# Patient Record
Sex: MALE | Race: BLACK OR AFRICAN AMERICAN | NOT HISPANIC OR LATINO | Employment: FULL TIME | URBAN - METROPOLITAN AREA
[De-identification: names, ages, dates, MRNs, and addresses within clinical notes are randomized per-mention and may not be internally consistent; named-entity substitution may affect disease eponyms.]

---

## 2024-07-23 ENCOUNTER — APPOINTMENT (OUTPATIENT)
Dept: CT IMAGING | Facility: CLINIC | Age: 24
End: 2024-07-23
Attending: EMERGENCY MEDICINE
Payer: COMMERCIAL

## 2024-07-23 ENCOUNTER — HOSPITAL ENCOUNTER (EMERGENCY)
Facility: CLINIC | Age: 24
Discharge: HOME | End: 2024-07-23
Attending: EMERGENCY MEDICINE | Admitting: EMERGENCY MEDICINE
Payer: COMMERCIAL

## 2024-07-23 VITALS
BODY MASS INDEX: 25.28 KG/M2 | HEIGHT: 74 IN | HEART RATE: 85 BPM | RESPIRATION RATE: 20 BRPM | TEMPERATURE: 97.6 F | OXYGEN SATURATION: 100 % | SYSTOLIC BLOOD PRESSURE: 105 MMHG | DIASTOLIC BLOOD PRESSURE: 42 MMHG | WEIGHT: 197 LBS

## 2024-07-23 DIAGNOSIS — N20.0 KIDNEY STONE: ICD-10-CM

## 2024-07-23 LAB
ALBUMIN SERPL BCG-MCNC: 4.7 G/DL (ref 3.5–5.2)
ALBUMIN UR-MCNC: NEGATIVE MG/DL
ALP SERPL-CCNC: 78 U/L (ref 40–150)
ALT SERPL W P-5'-P-CCNC: 23 U/L (ref 0–70)
ANION GAP SERPL CALCULATED.3IONS-SCNC: 12 MMOL/L (ref 7–15)
APPEARANCE UR: CLEAR
AST SERPL W P-5'-P-CCNC: 21 U/L (ref 0–45)
BASOPHILS # BLD AUTO: 0 10E3/UL (ref 0–0.2)
BASOPHILS NFR BLD AUTO: 0 %
BILIRUB SERPL-MCNC: 0.3 MG/DL
BILIRUB UR QL STRIP: NEGATIVE
BUN SERPL-MCNC: 12.8 MG/DL (ref 6–20)
CALCIUM SERPL-MCNC: 9.5 MG/DL (ref 8.8–10.4)
CHLORIDE SERPL-SCNC: 104 MMOL/L (ref 98–107)
COLOR UR AUTO: ABNORMAL
CREAT SERPL-MCNC: 0.9 MG/DL (ref 0.67–1.17)
EGFRCR SERPLBLD CKD-EPI 2021: >90 ML/MIN/1.73M2
EOSINOPHIL # BLD AUTO: 0.1 10E3/UL (ref 0–0.7)
EOSINOPHIL NFR BLD AUTO: 1 %
ERYTHROCYTE [DISTWIDTH] IN BLOOD BY AUTOMATED COUNT: 11.7 % (ref 10–15)
GLUCOSE SERPL-MCNC: 105 MG/DL (ref 70–99)
GLUCOSE UR STRIP-MCNC: NEGATIVE MG/DL
HCO3 SERPL-SCNC: 24 MMOL/L (ref 22–29)
HCT VFR BLD AUTO: 42.2 % (ref 40–53)
HGB BLD-MCNC: 13.9 G/DL (ref 13.3–17.7)
HGB UR QL STRIP: ABNORMAL
HOLD SPECIMEN: NORMAL
IMM GRANULOCYTES # BLD: 0 10E3/UL
IMM GRANULOCYTES NFR BLD: 0 %
KETONES UR STRIP-MCNC: NEGATIVE MG/DL
LEUKOCYTE ESTERASE UR QL STRIP: NEGATIVE
LYMPHOCYTES # BLD AUTO: 1.7 10E3/UL (ref 0.8–5.3)
LYMPHOCYTES NFR BLD AUTO: 18 %
MCH RBC QN AUTO: 29.5 PG (ref 26.5–33)
MCHC RBC AUTO-ENTMCNC: 32.9 G/DL (ref 31.5–36.5)
MCV RBC AUTO: 90 FL (ref 78–100)
MONOCYTES # BLD AUTO: 0.5 10E3/UL (ref 0–1.3)
MONOCYTES NFR BLD AUTO: 5 %
MUCOUS THREADS #/AREA URNS LPF: PRESENT /LPF
NEUTROPHILS # BLD AUTO: 7.3 10E3/UL (ref 1.6–8.3)
NEUTROPHILS NFR BLD AUTO: 76 %
NITRATE UR QL: NEGATIVE
NRBC # BLD AUTO: 0 10E3/UL
NRBC BLD AUTO-RTO: 0 /100
PH UR STRIP: 7 [PH] (ref 5–7)
PLATELET # BLD AUTO: 308 10E3/UL (ref 150–450)
POTASSIUM SERPL-SCNC: 4.1 MMOL/L (ref 3.4–5.3)
PROT SERPL-MCNC: 7.5 G/DL (ref 6.4–8.3)
RBC # BLD AUTO: 4.71 10E6/UL (ref 4.4–5.9)
RBC URINE: 79 /HPF
SODIUM SERPL-SCNC: 140 MMOL/L (ref 135–145)
SP GR UR STRIP: 1.01 (ref 1–1.03)
UROBILINOGEN UR STRIP-MCNC: NORMAL MG/DL
WBC # BLD AUTO: 9.5 10E3/UL (ref 4–11)
WBC URINE: 2 /HPF

## 2024-07-23 PROCEDURE — 96374 THER/PROPH/DIAG INJ IV PUSH: CPT

## 2024-07-23 PROCEDURE — 81003 URINALYSIS AUTO W/O SCOPE: CPT | Performed by: EMERGENCY MEDICINE

## 2024-07-23 PROCEDURE — 250N000011 HC RX IP 250 OP 636: Performed by: EMERGENCY MEDICINE

## 2024-07-23 PROCEDURE — 85025 COMPLETE CBC W/AUTO DIFF WBC: CPT | Performed by: EMERGENCY MEDICINE

## 2024-07-23 PROCEDURE — 99285 EMERGENCY DEPT VISIT HI MDM: CPT | Mod: 25

## 2024-07-23 PROCEDURE — 74176 CT ABD & PELVIS W/O CONTRAST: CPT

## 2024-07-23 PROCEDURE — 36415 COLL VENOUS BLD VENIPUNCTURE: CPT | Performed by: EMERGENCY MEDICINE

## 2024-07-23 PROCEDURE — 82040 ASSAY OF SERUM ALBUMIN: CPT | Performed by: EMERGENCY MEDICINE

## 2024-07-23 PROCEDURE — 96375 TX/PRO/DX INJ NEW DRUG ADDON: CPT

## 2024-07-23 RX ORDER — KETOROLAC TROMETHAMINE 15 MG/ML
15 INJECTION, SOLUTION INTRAMUSCULAR; INTRAVENOUS ONCE
Status: COMPLETED | OUTPATIENT
Start: 2024-07-23 | End: 2024-07-23

## 2024-07-23 RX ORDER — HYDROCODONE BITARTRATE AND ACETAMINOPHEN 5; 325 MG/1; MG/1
1 TABLET ORAL EVERY 6 HOURS PRN
Qty: 10 TABLET | Refills: 0 | Status: SHIPPED | OUTPATIENT
Start: 2024-07-23 | End: 2024-07-26

## 2024-07-23 RX ORDER — ONDANSETRON 2 MG/ML
4 INJECTION INTRAMUSCULAR; INTRAVENOUS ONCE
Status: COMPLETED | OUTPATIENT
Start: 2024-07-23 | End: 2024-07-23

## 2024-07-23 RX ORDER — SENNA AND DOCUSATE SODIUM 50; 8.6 MG/1; MG/1
1 TABLET, FILM COATED ORAL AT BEDTIME
Qty: 5 TABLET | Refills: 0 | Status: SHIPPED | OUTPATIENT
Start: 2024-07-23 | End: 2024-07-28

## 2024-07-23 RX ADMIN — KETOROLAC TROMETHAMINE 15 MG: 15 INJECTION, SOLUTION INTRAMUSCULAR; INTRAVENOUS at 08:33

## 2024-07-23 RX ADMIN — ONDANSETRON 4 MG: 2 INJECTION INTRAMUSCULAR; INTRAVENOUS at 08:33

## 2024-07-23 ASSESSMENT — COLUMBIA-SUICIDE SEVERITY RATING SCALE - C-SSRS
6. HAVE YOU EVER DONE ANYTHING, STARTED TO DO ANYTHING, OR PREPARED TO DO ANYTHING TO END YOUR LIFE?: NO
2. HAVE YOU ACTUALLY HAD ANY THOUGHTS OF KILLING YOURSELF IN THE PAST MONTH?: NO
1. IN THE PAST MONTH, HAVE YOU WISHED YOU WERE DEAD OR WISHED YOU COULD GO TO SLEEP AND NOT WAKE UP?: NO

## 2024-07-23 ASSESSMENT — ACTIVITIES OF DAILY LIVING (ADL)
ADLS_ACUITY_SCORE: 35
ADLS_ACUITY_SCORE: 33
ADLS_ACUITY_SCORE: 35
ADLS_ACUITY_SCORE: 35

## 2024-07-23 NOTE — ED PROVIDER NOTES
"  Emergency Department Note      History of Present Illness     Chief Complaint   Abdominal Pain    HPI   Nic Cheatham is a 23 year old male who presents to the ED for evaluation of abdominal pain. Patient states had a sudden onset of lower left abdominal pain that radiates to his side, which stated around 3:00 am today and woke him up from sleep. The pain comes and goes and he rates it a 9/10. He reports nausea, 3 episode of vomiting today and has not eaten anything today. He mentions he had previously felt pain like this before back in 2021 but wasn't given a diagnosis for it. He denies diarrhea, constipation, dysuria, hematuria, or testicular concern. Denies tobacco or alcohol use.     Independent Historian   None    Review of External Notes   None     Past Medical History     Medical History and Problem List   The patient has no pertinent past medical history.     Medications   The patient is not currently taking any prescribed medications.     Surgical History   The patient has no pertinent past surgical history.     Physical Exam     Patient Vitals for the past 24 hrs:   BP Temp Temp src Pulse Resp SpO2 Height Weight   07/23/24 0641 137/59 97.6  F (36.4  C) Temporal 85 20 100 % -- --   07/23/24 0639 -- -- -- -- -- -- 1.88 m (6' 2\") 89.4 kg (197 lb)     Physical Exam  General: alert, in moderate discomfort.  HENT: mucous membranes moist  CV: regular rate, regular rhythm  Resp: normal effort, clear throughout, no crackles or wheezing  GI: abdomen soft, mild left flank tender.  Mild left CVA tenderness.  MSK: no bony tenderness  Skin: appropriately warm and dry, no rash to the left flank.  Extremities: no edema, calves non-tender  Neuro: alert, clear speech, oriented  Psych: normal mood and affect  :   Penis: no lesions, no pus from urethral meatus  Scrotum: No hernia. No lesions. No scrotal tenderness to palpation.  Normal testicular lie. No erythema.   Lower abdomen: No hernias noted.     Diagnostics "     Lab Results   Labs Ordered and Resulted from Time of ED Arrival to Time of ED Departure   COMPREHENSIVE METABOLIC PANEL - Abnormal       Result Value    Sodium 140      Potassium 4.1      Carbon Dioxide (CO2) 24      Anion Gap 12      Urea Nitrogen 12.8      Creatinine 0.90      GFR Estimate >90      Calcium 9.5      Chloride 104      Glucose 105 (*)     Alkaline Phosphatase 78      AST 21      ALT 23      Protein Total 7.5      Albumin 4.7      Bilirubin Total 0.3     ROUTINE UA WITH MICROSCOPIC REFLEX TO CULTURE - Abnormal    Color Urine Light Yellow      Appearance Urine Clear      Glucose Urine Negative      Bilirubin Urine Negative      Ketones Urine Negative      Specific Gravity Urine 1.015      Blood Urine Moderate (*)     pH Urine 7.0      Protein Albumin Urine Negative      Urobilinogen Urine Normal      Nitrite Urine Negative      Leukocyte Esterase Urine Negative      Mucus Urine Present (*)     RBC Urine 79 (*)     WBC Urine 2     CBC WITH PLATELETS AND DIFFERENTIAL    WBC Count 9.5      RBC Count 4.71      Hemoglobin 13.9      Hematocrit 42.2      MCV 90      MCH 29.5      MCHC 32.9      RDW 11.7      Platelet Count 308      % Neutrophils 76      % Lymphocytes 18      % Monocytes 5      % Eosinophils 1      % Basophils 0      % Immature Granulocytes 0      NRBCs per 100 WBC 0      Absolute Neutrophils 7.3      Absolute Lymphocytes 1.7      Absolute Monocytes 0.5      Absolute Eosinophils 0.1      Absolute Basophils 0.0      Absolute Immature Granulocytes 0.0      Absolute NRBCs 0.0       Imaging   Abd/pelvis CT no contrast - Stone Protocol   Final Result   IMPRESSION:    1.  3 mm obstructive calculus is seen along the distal left ureter   with mild resulting hydronephrosis and hydroureter.   2.  Several nonobstructive calculi are noted along the right kidney   (at least 7) measuring up to 4 mm along the inferior pole.      ROMINA BEST MD            SYSTEM ID:  [id number]        Independent  Interpretation   None    ED Course      Medications Administered   Medications   ketorolac (TORADOL) injection 15 mg (15 mg Intravenous $Given 7/23/24 0833)   ondansetron (ZOFRAN) injection 4 mg (4 mg Intravenous $Given 7/23/24 0833)     Discussion of Management   None    ED Course   ED Course as of 07/23/24 1119   Tue Jul 23, 2024   0747 I obtained history and examined the patient as noted above.      Optional/Additional Documentation  None    Medical Decision Making / Diagnosis     CMS Diagnoses: None    MIPS       None    [facility]   Nic Cheatham is a 23 year old male without other significant medical history, who presents today with acute onset of left-sided flank pain.  On exam, the patient is well-appearing, without rash to suggest zoster, or findings to suggest testicular torsion.  CT confirms a 3 mm mm ureteral stone at the left UVJ.  Renal function is normal.  CT and lab workup show no other alternative etiology that could be causing his symptoms (e.g., AAA, appendicitis, pyelonephritis). There is no fever or convincing evidence of a urinary tract infection. On recheck, his pain is controlled with interventions in the ED and he is tolerating POs. I will prescribe supportive medications and Flomax to facilitate stone passage. I have advised him to return for uncontrolled pain, vomiting, fever, or any other concerning symptoms. I also advised to strain his urine to look for a stone and submit it to his primary doctor for lab analysis.  Finally, I have advised follow up with urology or PCP within 3-5 days.    Disposition   The patient was discharged.     Diagnosis     ICD-10-CM    1. Kidney stone  N20.0 Primary Care Referral         Discharge Medications   New Prescriptions    HYDROCODONE-ACETAMINOPHEN (NORCO) 5-325 MG TABLET    Take 1 tablet by mouth every 6 hours as needed for severe pain    SENNA-DOCUSATE SODIUM (SENNA S) 8.6-50 MG TABLET    Take 1 tablet by mouth at bedtime for 5 days     Scribe  Disclosure:  I, Kassidyeric Cody, am serving as a scribe at 8:01 AM on 7/23/2024 to document services personally performed by Angie Valentino MD based on my observations and the provider's statements to me.     Scribe Disclosure:  I, VICKIEYENNY DHILLON, am serving as a scribe  at 8:07 AM on 7/23/2024 to document services personally performed by Angie Valentino MD based on my observations and the provider's statements to me.      Angie Valentino MD  07/23/24 1517

## 2024-08-23 ENCOUNTER — HOSPITAL ENCOUNTER (EMERGENCY)
Facility: CLINIC | Age: 24
Discharge: HOME | End: 2024-08-23
Attending: EMERGENCY MEDICINE | Admitting: EMERGENCY MEDICINE
Payer: COMMERCIAL

## 2024-08-23 VITALS
BODY MASS INDEX: 25.21 KG/M2 | OXYGEN SATURATION: 98 % | HEART RATE: 81 BPM | TEMPERATURE: 97.6 F | DIASTOLIC BLOOD PRESSURE: 68 MMHG | WEIGHT: 196.43 LBS | RESPIRATION RATE: 18 BRPM | HEIGHT: 74 IN | SYSTOLIC BLOOD PRESSURE: 133 MMHG

## 2024-08-23 DIAGNOSIS — N20.0 KIDNEY STONE: ICD-10-CM

## 2024-08-23 LAB
ALBUMIN UR-MCNC: NEGATIVE MG/DL
ANION GAP SERPL CALCULATED.3IONS-SCNC: 13 MMOL/L (ref 7–15)
APPEARANCE UR: CLEAR
BASOPHILS # BLD AUTO: 0 10E3/UL (ref 0–0.2)
BASOPHILS NFR BLD AUTO: 0 %
BILIRUB UR QL STRIP: NEGATIVE
BUN SERPL-MCNC: 15.4 MG/DL (ref 6–20)
CALCIUM SERPL-MCNC: 9.5 MG/DL (ref 8.8–10.4)
CHLORIDE SERPL-SCNC: 100 MMOL/L (ref 98–107)
COLOR UR AUTO: NORMAL
CREAT SERPL-MCNC: 1.01 MG/DL (ref 0.67–1.17)
EGFRCR SERPLBLD CKD-EPI 2021: >90 ML/MIN/1.73M2
EOSINOPHIL # BLD AUTO: 0.2 10E3/UL (ref 0–0.7)
EOSINOPHIL NFR BLD AUTO: 4 %
ERYTHROCYTE [DISTWIDTH] IN BLOOD BY AUTOMATED COUNT: 11.8 % (ref 10–15)
GLUCOSE SERPL-MCNC: 112 MG/DL (ref 70–99)
GLUCOSE UR STRIP-MCNC: NEGATIVE MG/DL
HCO3 SERPL-SCNC: 24 MMOL/L (ref 22–29)
HCT VFR BLD AUTO: 39.4 % (ref 40–53)
HGB BLD-MCNC: 13.3 G/DL (ref 13.3–17.7)
HGB UR QL STRIP: NEGATIVE
IMM GRANULOCYTES # BLD: 0 10E3/UL
IMM GRANULOCYTES NFR BLD: 0 %
KETONES UR STRIP-MCNC: NEGATIVE MG/DL
LEUKOCYTE ESTERASE UR QL STRIP: NEGATIVE
LYMPHOCYTES # BLD AUTO: 2.5 10E3/UL (ref 0.8–5.3)
LYMPHOCYTES NFR BLD AUTO: 52 %
MCH RBC QN AUTO: 30.2 PG (ref 26.5–33)
MCHC RBC AUTO-ENTMCNC: 33.8 G/DL (ref 31.5–36.5)
MCV RBC AUTO: 89 FL (ref 78–100)
MONOCYTES # BLD AUTO: 0.5 10E3/UL (ref 0–1.3)
MONOCYTES NFR BLD AUTO: 10 %
NEUTROPHILS # BLD AUTO: 1.7 10E3/UL (ref 1.6–8.3)
NEUTROPHILS NFR BLD AUTO: 35 %
NITRATE UR QL: NEGATIVE
NRBC # BLD AUTO: 0 10E3/UL
NRBC BLD AUTO-RTO: 0 /100
PH UR STRIP: 6 [PH] (ref 5–7)
PLATELET # BLD AUTO: 296 10E3/UL (ref 150–450)
POTASSIUM SERPL-SCNC: 3.9 MMOL/L (ref 3.4–5.3)
RBC # BLD AUTO: 4.41 10E6/UL (ref 4.4–5.9)
RBC URINE: 0 /HPF
SODIUM SERPL-SCNC: 137 MMOL/L (ref 135–145)
SP GR UR STRIP: 1 (ref 1–1.03)
UROBILINOGEN UR STRIP-MCNC: NORMAL MG/DL
WBC # BLD AUTO: 4.9 10E3/UL (ref 4–11)
WBC URINE: <1 /HPF

## 2024-08-23 PROCEDURE — 80048 BASIC METABOLIC PNL TOTAL CA: CPT | Performed by: EMERGENCY MEDICINE

## 2024-08-23 PROCEDURE — 85025 COMPLETE CBC W/AUTO DIFF WBC: CPT | Performed by: EMERGENCY MEDICINE

## 2024-08-23 PROCEDURE — 81001 URINALYSIS AUTO W/SCOPE: CPT | Performed by: EMERGENCY MEDICINE

## 2024-08-23 PROCEDURE — 36415 COLL VENOUS BLD VENIPUNCTURE: CPT | Performed by: EMERGENCY MEDICINE

## 2024-08-23 PROCEDURE — 99283 EMERGENCY DEPT VISIT LOW MDM: CPT

## 2024-08-23 ASSESSMENT — COLUMBIA-SUICIDE SEVERITY RATING SCALE - C-SSRS
6. HAVE YOU EVER DONE ANYTHING, STARTED TO DO ANYTHING, OR PREPARED TO DO ANYTHING TO END YOUR LIFE?: NO
1. IN THE PAST MONTH, HAVE YOU WISHED YOU WERE DEAD OR WISHED YOU COULD GO TO SLEEP AND NOT WAKE UP?: NO
2. HAVE YOU ACTUALLY HAD ANY THOUGHTS OF KILLING YOURSELF IN THE PAST MONTH?: NO

## 2024-08-23 ASSESSMENT — ACTIVITIES OF DAILY LIVING (ADL)
ADLS_ACUITY_SCORE: 35
ADLS_ACUITY_SCORE: 35

## 2024-08-23 NOTE — DISCHARGE INSTRUCTIONS
The emergency room does not recommend repeat imaging today. We have referred you to urology Continue to strain your urine, return with fever greater than 100.4, or persistant vomiting, or constant untolerable pain.

## 2024-08-23 NOTE — ED TRIAGE NOTES
Pt arrives ambulatory by self for left flank pain. Was diagnosed with kidney stones around 1 month ago and reports feeling the same pain yesterday. Denies pain currently. Took ibuprofen yesterday that helped. Denies blood in urine or urinary symptoms.

## 2024-08-23 NOTE — ED PROVIDER NOTES
"  Emergency Department Note      History of Present Illness     Chief Complaint   Flank Pain    HPI   Nic Cheatham is a 23 year old male who presents with left sided flank pain. The patient visited the Emergency Department 3 weeks ago where he was diagnosed with kidney stones and has had intermittent pain since then. He denies vomiting, fever, or issues urinating. The patient denies any other medical history.    Independent Historian   None    Review of External Notes     Past Medical History     Medical History and Problem List   No past medical history.    Medications   No current outpatient medications.    Surgical History   No past surgical history.    Physical Exam     Patient Vitals for the past 24 hrs:   BP Temp Pulse Resp SpO2 Height Weight   08/23/24 0901 115/48 -- 86 -- 100 % -- --   08/23/24 0838 (!) 147/59 97.6  F (36.4  C) 79 18 100 % 1.88 m (6' 2\") 89.1 kg (196 lb 6.9 oz)     Physical Exam  Vitals reviewed.   Cardiovascular:      Rate and Rhythm: Normal rate.   Pulmonary:      Effort: Pulmonary effort is normal.   Abdominal:      General: Abdomen is flat. Bowel sounds are normal.   Neurological:      General: No focal deficit present.      Mental Status: He is alert.         Diagnostics     Lab Results   Labs Ordered and Resulted from Time of ED Arrival to Time of ED Departure   BASIC METABOLIC PANEL - Abnormal       Result Value    Sodium 137      Potassium 3.9      Chloride 100      Carbon Dioxide (CO2) 24      Anion Gap 13      Urea Nitrogen 15.4      Creatinine 1.01      GFR Estimate >90      Calcium 9.5      Glucose 112 (*)    CBC WITH PLATELETS AND DIFFERENTIAL - Abnormal    WBC Count 4.9      RBC Count 4.41      Hemoglobin 13.3      Hematocrit 39.4 (*)     MCV 89      MCH 30.2      MCHC 33.8      RDW 11.8      Platelet Count 296      % Neutrophils 35      % Lymphocytes 52      % Monocytes 10      % Eosinophils 4      % Basophils 0      % Immature Granulocytes 0      NRBCs per 100 WBC 0      " Absolute Neutrophils 1.7      Absolute Lymphocytes 2.5      Absolute Monocytes 0.5      Absolute Eosinophils 0.2      Absolute Basophils 0.0      Absolute Immature Granulocytes 0.0      Absolute NRBCs 0.0     ROUTINE UA WITH MICROSCOPIC REFLEX TO CULTURE - Normal    Color Urine Straw      Appearance Urine Clear      Glucose Urine Negative      Bilirubin Urine Negative      Ketones Urine Negative      Specific Gravity Urine 1.005      Blood Urine Negative      pH Urine 6.0      Protein Albumin Urine Negative      Urobilinogen Urine Normal      Nitrite Urine Negative      Leukocyte Esterase Urine Negative      RBC Urine 0      WBC Urine <1       Imaging   No orders to display     EKG   None     Independent Interpretation   None    ED Course      Medications Administered   Medications - No data to display    Procedures   None     Discussion of Management   None    ED Course   ED Course as of 08/23/24 0959   Fri Aug 23, 2024   0903 I evaluated the patient, obtained history, and performed a physical exam as detailed above.    0959 I rechecked on the patient and explained the plan for discharge. They are comfortable with this plan.      Additional Documentation  None    Medical Decision Making / Diagnosis     CMS Diagnoses: None    MIPS   None    MDM   Nic Cheatham is a 23 year old male who presents with follow-up for prior kidney stone diagnosis.  States he still having left-sided pain.  Prior CT shows a passable stone.  Patient is well-appearing without blood in the urine without pain and without lab test abnormality.  Care was discussed with the patient no need for repeat imaging through the emergency department recommend he continue to strain urine and follow-up with urology.  Patient is offered reassurance and discharged home.    Disposition   The patient was discharged.     Diagnosis     ICD-10-CM    1. Kidney stone  N20.0 Adult Urology  Referral         Discharge Medications   New Prescriptions    No  medications on file     Scribe Disclosure:  I, Lyn Roxanne, am serving as a scribe at 8:51 AM on 8/23/2024 to document services personally performed by Niall De Oliveira MD based on my observations and the provider's statements to me.        Niall De Oliveira MD  08/25/24 2861

## 2024-08-28 ENCOUNTER — APPOINTMENT (OUTPATIENT)
Dept: ADMINISTRATIVE | Facility: CLINIC | Age: 24
End: 2024-08-28
Payer: COMMERCIAL

## 2024-09-03 NOTE — CONFIDENTIAL NOTE
Chief Complaint:   Kidney stones         History of Present Illness:   Nic Cheatham is a 23 year old male presenting for an evaluation of kidney stones.     The patient present to the ED on 7/23/2024 with LLQ pain, nausea, and vomiting. UA was notable for 79 RBCs. WBC count and renal function were WNL. CT abdomen pelvis was notable for a 3 mm stone in the distal left ureter with mild hydronephrosis. Several non-obstructing right intrarenal stones were noted. He returned to the ED on 8/23/2024 with pain and was discharged.     The patient reports ongoing, intermittent left flank pain. He denies dysuria and gross hematuria.     This is his first kidney stone episode.         Past Medical History:   No past medical history on file.         Past Surgical History:   No past surgical history on file.         Medications     No current outpatient medications on file.     No current facility-administered medications for this visit.            Allergies:   Sulfa antibiotics         Review of Systems:  From intake questionnaire   Negative 14 system review except as noted on HPI, nurse's note.         Physical Exam:   Patient is a 23 year old male evaluated via video visit.       Labs and Pathology:    I personally reviewed all applicable laboratory data and went over findings with patient  Significant for:    CBC RESULTS:  Recent Labs   Lab Test 08/23/24  0912 07/23/24  0804   WBC 4.9 9.5   HGB 13.3 13.9    308        BMP RESULTS:  Recent Labs   Lab Test 08/23/24  0912 07/23/24  0804    140   POTASSIUM 3.9 4.1   CHLORIDE 100 104   CO2 24 24   ANIONGAP 13 12   * 105*   BUN 15.4 12.8   CR 1.01 0.90   GFRESTIMATED >90 >90   CHUCHO 9.5 9.5       UA RESULTS:   Recent Labs   Lab Test 08/23/24  0850 07/23/24  1042   SG 1.005 1.015   URINEPH 6.0 7.0   NITRITE Negative Negative   RBCU 0 79*   WBCU <1 2         Imaging:    I personally reviewed all applicable imaging and went over findings with  patient.  Significant for:    Results for orders placed or performed during the hospital encounter of 07/23/24   Abd/pelvis CT no contrast - Stone Protocol    Narrative    CT ABDOMEN PELVIS W/O CONTRAST 7/23/2024 9:01 AM    CLINICAL HISTORY: left flank pain  TECHNIQUE: CT scan of the abdomen and pelvis was performed without IV  contrast. Multiplanar reformats were obtained. Dose reduction  techniques were used.  CONTRAST: None.    COMPARISON: None.    FINDINGS:   LOWER CHEST: Unremarkable.    HEPATOBILIARY: No significant mass or bile duct dilatation. No  calcified gallstones.     PANCREAS: No significant mass, duct dilatation, or inflammatory  change.    SPLEEN: Normal size.    ADRENAL GLANDS: No significant nodules.    KIDNEYS/BLADDER: 3 mm obstructive calculus is seen along the distal  left ureter with mild resulting hydronephrosis. Several nonobstructive  calculi are noted along the right kidney (at least 7) measuring up to  4 mm along the inferior pole.    BOWEL: No obstruction or inflammatory change.    LYMPH NODES: No lymphadenopathy.    VASCULATURE: No abdominal aortic aneurysm.    PELVIC ORGANS: No pelvic masses.    OTHER: None.    MUSCULOSKELETAL: 1.2 cm sclerotic focus is seen within the proximal  right femur and may reflect a bone island.      Impression    IMPRESSION:   1.  3 mm obstructive calculus is seen along the distal left ureter  with mild resulting hydronephrosis and hydroureter.  2.  Several nonobstructive calculi are noted along the right kidney  (at least 7) measuring up to 4 mm along the inferior pole.    ROMINA BEST MD         SYSTEM ID:  [id number]            Assessment and Plan:   Assessment: Nic Cheatham is a 23 year old male seen in evaluation for a 3 mm distal left ureteral stone found on CT from 7/23/2024. He continues to report intermittent left flank pain.     We discussed that we should get a follow up CT to see if the stone has made progress in passing. If it has not,  ureteroscopy would be indicated at this time. The patient is agreeable to scheduling.     Plan:  CT abdomen pelvis without contrast to evaluate for passage of the left ureteral stone.     Mireya Morrissey PA-C  Department of Urology

## 2024-09-04 ENCOUNTER — VIRTUAL VISIT (OUTPATIENT)
Dept: UROLOGY | Facility: CLINIC | Age: 24
End: 2024-09-04
Payer: COMMERCIAL

## 2024-09-04 DIAGNOSIS — N20.0 KIDNEY STONE: ICD-10-CM

## 2024-09-04 PROCEDURE — 99203 OFFICE O/P NEW LOW 30 MIN: CPT | Mod: 95 | Performed by: STUDENT IN AN ORGANIZED HEALTH CARE EDUCATION/TRAINING PROGRAM

## 2024-09-04 RX ORDER — IBUPROFEN 200 MG
200 TABLET ORAL EVERY 4 HOURS PRN
COMMUNITY

## 2024-09-04 NOTE — PROGRESS NOTES
Nic Cheatham is a 23 year old year old who is being evaluated via a billable video visit.      How would you like to obtain your AVS? Mail a copy  If the video visit is dropped, the invitation should be resent by: Text to cell phone: [phone]  Will anyone else be joining your video visit? No    Video-Visit Details    Type of service:  Video Visit     Originating Location (pt. Location): Home    Distant Location (provider location):  Off-site  Platform used for Video Visit: Julisa

## 2024-09-12 ENCOUNTER — HOSPITAL ENCOUNTER (OUTPATIENT)
Dept: CT IMAGING | Facility: CLINIC | Age: 24
Discharge: HOME | End: 2024-09-12
Attending: STUDENT IN AN ORGANIZED HEALTH CARE EDUCATION/TRAINING PROGRAM | Admitting: STUDENT IN AN ORGANIZED HEALTH CARE EDUCATION/TRAINING PROGRAM
Payer: COMMERCIAL

## 2024-09-12 DIAGNOSIS — N20.0 KIDNEY STONE: ICD-10-CM

## 2024-09-12 PROCEDURE — 74176 CT ABD & PELVIS W/O CONTRAST: CPT

## 2025-01-14 ENCOUNTER — HOSPITAL ENCOUNTER (EMERGENCY)
Facility: CLINIC | Age: 25
Discharge: HOME | End: 2025-01-14
Attending: EMERGENCY MEDICINE | Admitting: EMERGENCY MEDICINE

## 2025-01-14 VITALS
RESPIRATION RATE: 18 BRPM | BODY MASS INDEX: 25.78 KG/M2 | DIASTOLIC BLOOD PRESSURE: 71 MMHG | SYSTOLIC BLOOD PRESSURE: 108 MMHG | WEIGHT: 200.84 LBS | HEIGHT: 74 IN | HEART RATE: 88 BPM | OXYGEN SATURATION: 99 % | TEMPERATURE: 99.9 F

## 2025-01-14 DIAGNOSIS — J02.9 PHARYNGITIS, UNSPECIFIED ETIOLOGY: ICD-10-CM

## 2025-01-14 LAB
FLUAV RNA SPEC QL NAA+PROBE: NEGATIVE
FLUBV RNA RESP QL NAA+PROBE: NEGATIVE
RSV RNA SPEC NAA+PROBE: NEGATIVE
S PYO DNA THROAT QL NAA+PROBE: NOT DETECTED
SARS-COV-2 RNA RESP QL NAA+PROBE: NEGATIVE

## 2025-01-14 PROCEDURE — 87637 SARSCOV2&INF A&B&RSV AMP PRB: CPT | Performed by: EMERGENCY MEDICINE

## 2025-01-14 PROCEDURE — 87651 STREP A DNA AMP PROBE: CPT | Performed by: EMERGENCY MEDICINE

## 2025-01-14 PROCEDURE — 99283 EMERGENCY DEPT VISIT LOW MDM: CPT

## 2025-01-14 RX ORDER — IBUPROFEN 200 MG
200 TABLET ORAL EVERY 4 HOURS PRN
Qty: 60 TABLET | Refills: 0 | Status: SHIPPED | OUTPATIENT
Start: 2025-01-14

## 2025-01-14 ASSESSMENT — ACTIVITIES OF DAILY LIVING (ADL)
ADLS_ACUITY_SCORE: 41
ADLS_ACUITY_SCORE: 41

## 2025-01-14 NOTE — ED PROVIDER NOTES
"  Emergency Department Note      History of Present Illness     Chief Complaint   Pharyngitis      HPI   Nic Cheatham is a otherwise healthy 24 year old male presenting to the ED with pharyngitis. The patient reports developing a sore throat last night along with subjective fevers and headaches. Patient denies otalgia. He also denies ill contact. He has not gotten his influenza vaccine this year.     Independent Historian   None    Review of External Notes   None    Past Medical History     Medical History and Problem List   The patient denies any significant past medical history.     Medications   Cyclobenzaprine     Surgical History   History reviewed. No pertinent surgical history.     Physical Exam     Patient Vitals for the past 24 hrs:   BP Temp Temp src Pulse Resp SpO2 Height Weight   01/14/25 0900 108/71 -- -- 88 18 99 % -- --   01/14/25 0728 110/78 99.9  F (37.7  C) Temporal 91 18 100 % 1.88 m (6' 2\") 91.1 kg (200 lb 13.4 oz)     Physical Exam  General:              Well-nourished              Speaking in full sentences   Resting comfortably on chair, using cell phone  Eyes:              Conjunctiva without injection or scleral icterus              PERRL              EOM full w/out entrapment or proptosis  ENT:              Moist mucous membranes              Posterior oropharynx clear without erythema or exudate              No tonsillar hypertrophy, exudate, asymmetry, nor uvular deviation              No oral lesions              Bilateral TM translucent and gray without air/fluid level or overlying erythema, bony landmarks visualized.              Nares patent              Pinnae normal              No midface swelling, erythema, or asymmetry  Neck:              Full ROM              No stiffness appreciated  Resp:              Lungs CTAB              No crackles, wheezing or audible rubs              Good air movement  CV:                    Normal rate, regular rhythm              S1 and S2 " present              No murmur, gallop or rub  GI:              BS present              Abdomen soft without distention              Non-tender to light and deep palpation              No guarding or rebound tenderness  Skin:              Warm, dry, well perfused              No rashes or open wounds on exposed skin  MSK:              Moves all extremities              No focal deformities or swelling  Neuro:              Alert              Answers questions appropriately              Moves all extremities equally              Gait stable  Psych:              Normal affect, normal mood    Diagnostics     Lab Results   Labs Ordered and Resulted from Time of ED Arrival to Time of ED Departure   INFLUENZA A/B, RSV AND SARS-COV2 PCR - Normal       Result Value    Influenza A PCR Negative      Influenza B PCR Negative      RSV PCR Negative      SARS CoV2 PCR Negative     GROUP A STREPTOCOCCUS PCR THROAT SWAB - Normal    Group A strep by PCR Not Detected         Imaging   No orders to display     Independent Interpretation   None    ED Course      Medications Administered   Medications - No data to display    Procedures   Procedures     Discussion of Management   None    ED Course   ED Course as of 01/14/25 0905   Tue Jan 14, 2025   0747 I obtained the history and examined the patient as noted above.     0854 I rechecked the patient and explained findings.        Additional Documentation  None    Medical Decision Making / Diagnosis     CMS Diagnoses: None    MIPS       None    MDM   Nic Cheatham is a 24 year old male who presented to the ER for evaluation of a sore throat.  VS on presentation reveal T of 99.9 and otherwise no acute abnormalities.  History, exam, and ER course as outlined above.  At present, symptoms are most likely secondary to viral syndrome.  Rapid strep test obtained, and returned negative.  COVID-19 testing returned negative, as did influenza.  No tonsillar asymmetry, uvular deviation, trismus,  or intolerance of secretions, and I feel this is unlikely to represent deeper space neck infection such as peritonsillar abscess, retropharyngeal abscess, nor epiglottitis.  Patient is without evidence of facial cellulitis, nor evidence of dental infection.  Results and clinical impression discussed with patient.  Do feel outpatient management is safe and appropriate at this time with supportive treatments including anti-inflammatory medications, warm, salt-water gargles, and close monitoring of symptoms.  Do not feel presentation is consistent with meningitis/encephalitis.  Patient well appearing, using cell phone on my interactions and exhibiting normal mentation and full ROM.  Discussed need for return to the ER with worsening pain, swelling, difficulty swallowing/tolerating PO, difficulty breathing, or any other new or troubling symptoms as this could suggest evolving process such as deeper space infection.  Follow-up with primary care provider in 2-3 days for re-check.  All questions were answered prior to discharge.     Disposition   The patient was discharged.     Diagnosis     ICD-10-CM    1. Pharyngitis, unspecified etiology  J02.9            Discharge Medications   Discharge Medication List as of 1/14/2025  9:00 AM        START taking these medications    Details   !! ibuprofen (ADVIL/MOTRIN) 200 MG tablet Take 1 tablet (200 mg) by mouth every 4 hours as needed for mild pain., Disp-60 tablet, R-0, Local Print       !! - Potential duplicate medications found. Please discuss with provider.            Scribe Disclosure:  I, Saritha Lyles, am serving as a scribe at 7:54 AM on 1/14/2025 to document services personally performed by Niall Waite MD based on my observations and the provider's statements to me.        Niall Waite MD  01/14/25 [id number]

## 2025-01-14 NOTE — DISCHARGE INSTRUCTIONS
Please monitor symptoms closely    Tylenol or ibuprofen can be taken to help with pain    Warm teas or liquids, or throat lozenges can also be taken for symptoms    Return to the ER if you develop worsening sore throat, headache, fevers, or any other concerns.    Discharge Instructions  Sore Throat  You were seen today for a sore throat, in medical terms this is called pharyngitis. A sore throat is most often caused by viral or bacterial infections; most of which (>80%) are caused by a virus. Viral infections are not treated with antibiotics, treatment for a viral sore throat consists mostly of treating symptoms (such as pain and fever) with over-the-counter pain relievers/fever reducers.  Strep throat is a kind of sore throat caused by Group A streptococcus bacteria.  This type of sore throat is treated with antibiotics so we test for this with a  strep test  and, if positive, prescribe antibiotics.  Generally, every Emergency Department visit should have a follow-up clinic visit with either a primary or a specialty clinic/provider. Please follow-up as instructed by your emergency provider today.  Return to the Emergency Department if:  If you have difficulty breathing.  If you are drooling because you are unable to swallow.  You become dehydrated due to difficulty drinking. Signs of dehydration include weakness, dry mouth, and urinating less than 3 times per day.  If you develop swelling of the neck or tongue.  If you develop a high fever with either severe or unusual headache or stiff neck.    Treatment:    Pain relief -- Non-prescription pain medications, such as Tylenol  (acetaminophen) or Motrin , Advil  (ibuprofen) are usually recommended for pain.  Do not use a medicine that you are allergic to, or if your provider has told you not to use it.  Soft or liquid diet. Concentrate on liquids to keep yourself hydrated. Cold liquids (popsicles, ice cream, etc.) may feel good on your throat.  If you were given a  prescription for medicine here today, be sure to read all of the information (including the package insert) that comes with your prescription.  This will include important information about the medicine, its side effects, and any warnings that you need to know about.  The pharmacist who fills the prescription can provide more information and answer questions you may have about the medicine.  If you have questions or concerns that the pharmacist cannot address, please call or return to the Emergency Department.   Remember that you can always come back to the Emergency Department if you are not able to see your regular provider in the amount of time listed above, if you get any new symptoms, or if there is anything that worries you.

## 2025-01-14 NOTE — ED TRIAGE NOTES
Pt here with c/o sore throat since yesterday. Subjective fever at home. Denies cough, n/v/d, SOB or CP. Also c/o mild HA since yesterday. ABC intact.